# Patient Record
Sex: MALE | Race: WHITE | NOT HISPANIC OR LATINO | URBAN - METROPOLITAN AREA
[De-identification: names, ages, dates, MRNs, and addresses within clinical notes are randomized per-mention and may not be internally consistent; named-entity substitution may affect disease eponyms.]

---

## 2017-10-02 ENCOUNTER — IMPORTED ENCOUNTER (OUTPATIENT)
Dept: URBAN - METROPOLITAN AREA CLINIC 50 | Facility: CLINIC | Age: 67
End: 2017-10-02

## 2017-10-05 ENCOUNTER — IMPORTED ENCOUNTER (OUTPATIENT)
Dept: URBAN - METROPOLITAN AREA CLINIC 50 | Facility: CLINIC | Age: 67
End: 2017-10-05

## 2018-11-15 ENCOUNTER — IMPORTED ENCOUNTER (OUTPATIENT)
Dept: URBAN - METROPOLITAN AREA CLINIC 50 | Facility: CLINIC | Age: 68
End: 2018-11-15

## 2020-01-13 NOTE — PATIENT DISCUSSION
- Happy with vision in glasses, but would like to start wearing CTLs again.  Needs a strategy that provides her with good distance and near vision

## 2020-06-11 ENCOUNTER — IMPORTED ENCOUNTER (OUTPATIENT)
Dept: URBAN - METROPOLITAN AREA CLINIC 50 | Facility: CLINIC | Age: 70
End: 2020-06-11

## 2020-07-13 ENCOUNTER — IMPORTED ENCOUNTER (OUTPATIENT)
Dept: URBAN - METROPOLITAN AREA CLINIC 50 | Facility: CLINIC | Age: 70
End: 2020-07-13

## 2020-12-07 ENCOUNTER — IMPORTED ENCOUNTER (OUTPATIENT)
Dept: URBAN - METROPOLITAN AREA CLINIC 50 | Facility: CLINIC | Age: 70
End: 2020-12-07

## 2020-12-21 ENCOUNTER — IMPORTED ENCOUNTER (OUTPATIENT)
Dept: URBAN - METROPOLITAN AREA CLINIC 50 | Facility: CLINIC | Age: 70
End: 2020-12-21

## 2020-12-22 ENCOUNTER — IMPORTED ENCOUNTER (OUTPATIENT)
Dept: URBAN - METROPOLITAN AREA CLINIC 50 | Facility: CLINIC | Age: 70
End: 2020-12-22

## 2020-12-28 ENCOUNTER — IMPORTED ENCOUNTER (OUTPATIENT)
Dept: URBAN - METROPOLITAN AREA CLINIC 50 | Facility: CLINIC | Age: 70
End: 2020-12-28

## 2021-04-17 ASSESSMENT — TONOMETRY
OD_IOP_MMHG: 15
OS_IOP_MMHG: 15
OD_IOP_MMHG: 14
OS_IOP_MMHG: 14
OD_IOP_MMHG: 14
OS_IOP_MMHG: 14
OD_IOP_MMHG: 15
OS_IOP_MMHG: 14

## 2021-04-17 ASSESSMENT — VISUAL ACUITY
OS_CC: J1+@ 12 IN
OD_BAT: 20/50
OS_CC: J5
OD_CC: J1
OS_CC: 20/25-2
OD_OTHER: 20/30. 20/50.
OD_CC: 20/30
OS_CC: J1
OS_BAT: 20/30
OS_OTHER: 20/30. 20/50.
OD_CC: J5
OS_CC: J1+
OD_OTHER: 20/50. >20/400.
OS_CC: 20/25
OD_CC: J1+
OD_CC: 20/30-2
OD_CC: J1@ 17 IN
OS_CC: 20/20
OD_CC: 20/30-
OS_CC: 20/20-2
OD_CC: 20/25
OD_CC: 20/30
OS_CC: J1@ 17 IN
OS_CC: 20/25
OS_BAT: 20/70
OS_OTHER: 20/70. >20/400.
OD_BAT: 20/30
OD_CC: J1+@ 12 IN

## 2021-05-28 NOTE — PATIENT DISCUSSION
No sign of traction and hole appears stable. Reviewed options, and patient elects NOT TO HAVE TREATMENT. Reviewed symptoms of retinal tear or detachment, including photopsia, floaters, shadow coming across vision or any sudden change or loss of central or peripheral vision and instructed patient to call and return for evaluation promptly should these occur.

## 2022-03-11 ENCOUNTER — PREPPED CHART (OUTPATIENT)
Dept: URBAN - METROPOLITAN AREA CLINIC 53 | Facility: CLINIC | Age: 72
End: 2022-03-11

## 2022-03-24 ENCOUNTER — COMPREHENSIVE EXAM (OUTPATIENT)
Dept: URBAN - METROPOLITAN AREA CLINIC 53 | Facility: CLINIC | Age: 72
End: 2022-03-24

## 2022-03-24 DIAGNOSIS — H25.13: ICD-10-CM

## 2022-03-24 DIAGNOSIS — H40.033: ICD-10-CM

## 2022-03-24 PROCEDURE — 66761 REVISION OF IRIS: CPT

## 2022-03-24 PROCEDURE — 92012 INTRM OPH EXAM EST PATIENT: CPT

## 2022-03-24 ASSESSMENT — TONOMETRY
OS_IOP_MMHG: 18
OD_IOP_MMHG: 15
OD_IOP_MMHG: 16
OS_IOP_MMHG: 21
OD_IOP_MMHG: 14

## 2022-03-24 ASSESSMENT — VISUAL ACUITY
OD_GLARE: 20/30
OS_GLARE: 20/25
OS_CC: 20/25-1
OS_GLARE: 20/30
OD_GLARE: 20/25
OD_CC: 20/25
OU_CC: J1

## 2022-04-07 ENCOUNTER — CLINIC PROCEDURE ONLY (OUTPATIENT)
Dept: URBAN - METROPOLITAN AREA CLINIC 53 | Facility: CLINIC | Age: 72
End: 2022-04-07

## 2022-04-07 DIAGNOSIS — Z98.890: ICD-10-CM

## 2022-04-07 DIAGNOSIS — H40.033: ICD-10-CM

## 2022-04-07 PROCEDURE — 66761 REVISION OF IRIS: CPT

## 2022-04-07 PROCEDURE — 92012 INTRM OPH EXAM EST PATIENT: CPT

## 2022-04-07 ASSESSMENT — VISUAL ACUITY
OU_CC: 20/30
OS_CC: 20/30
OD_CC: 20/25-1

## 2022-04-07 ASSESSMENT — TONOMETRY
OS_IOP_MMHG: 13
OD_IOP_MMHG: 13

## 2022-05-26 ENCOUNTER — POST-OP (OUTPATIENT)
Dept: URBAN - METROPOLITAN AREA CLINIC 53 | Facility: CLINIC | Age: 72
End: 2022-05-26

## 2022-05-26 DIAGNOSIS — H40.033: ICD-10-CM

## 2022-05-26 DIAGNOSIS — Z98.890: ICD-10-CM

## 2022-05-26 PROCEDURE — 92012 INTRM OPH EXAM EST PATIENT: CPT

## 2022-05-26 PROCEDURE — 66761 REVISION OF IRIS: CPT

## 2022-05-26 ASSESSMENT — VISUAL ACUITY
OD_CC: 20/25
OS_CC: 20/25-2

## 2022-05-26 ASSESSMENT — TONOMETRY
OD_IOP_MMHG: 16
OD_IOP_MMHG: 18
OS_IOP_MMHG: 14

## 2022-06-16 ENCOUNTER — POST-OP (OUTPATIENT)
Dept: URBAN - METROPOLITAN AREA CLINIC 53 | Facility: CLINIC | Age: 72
End: 2022-06-16

## 2022-06-16 DIAGNOSIS — H40.033: ICD-10-CM

## 2022-06-16 PROCEDURE — 92012 INTRM OPH EXAM EST PATIENT: CPT

## 2022-06-16 ASSESSMENT — VISUAL ACUITY
OD_CC: 20/25+2
OS_CC: 20/20-1

## 2022-06-16 ASSESSMENT — TONOMETRY
OD_IOP_MMHG: 14
OS_IOP_MMHG: 15

## 2023-10-30 NOTE — PATIENT DISCUSSION
ARTIFICIAL TEARS to affected eye(s) as needed. Debridement Text: The wound edges were debrided prior to proceeding with the closure to facilitate wound healing.

## 2024-03-07 ENCOUNTER — COMPREHENSIVE EXAM (OUTPATIENT)
Dept: URBAN - METROPOLITAN AREA CLINIC 53 | Facility: CLINIC | Age: 74
End: 2024-03-07

## 2024-03-07 DIAGNOSIS — H02.831: ICD-10-CM

## 2024-03-07 DIAGNOSIS — H52.4: ICD-10-CM

## 2024-03-07 DIAGNOSIS — H25.13: ICD-10-CM

## 2024-03-07 DIAGNOSIS — H02.834: ICD-10-CM

## 2024-03-07 PROCEDURE — 92134 CPTRZ OPH DX IMG PST SGM RTA: CPT

## 2024-03-07 PROCEDURE — 92015 DETERMINE REFRACTIVE STATE: CPT

## 2024-03-07 PROCEDURE — 92014 COMPRE OPH EXAM EST PT 1/>: CPT

## 2024-03-07 ASSESSMENT — VISUAL ACUITY
OS_GLARE: 20/40
OD_GLARE: 20/30
OD_GLARE: 20/30
OS_GLARE: 20/30
OU_SC: J1+
OS_CC: 20/25
OU_CC: 20/25
OD_CC: 20/25

## 2024-03-07 ASSESSMENT — TONOMETRY
OS_IOP_MMHG: 16
OD_IOP_MMHG: 16

## 2024-09-21 NOTE — PATIENT DISCUSSION
Progressive - Daily wearOD+1.50+0.81770+2.452279/15&nbsp;SN &nbsp; &nbsp; yaneli Right facial droop dysarthria and right arm weakness  NIH Score at the ED was 5 for me was 3  CT of the head no acute finding  Order MRI consult neuro  PT OT speech therapy